# Patient Record
Sex: MALE | Race: WHITE | HISPANIC OR LATINO | Employment: FULL TIME | URBAN - METROPOLITAN AREA
[De-identification: names, ages, dates, MRNs, and addresses within clinical notes are randomized per-mention and may not be internally consistent; named-entity substitution may affect disease eponyms.]

---

## 2024-07-01 ENCOUNTER — OFFICE VISIT (OUTPATIENT)
Dept: ORTHOPEDIC SURGERY | Facility: CLINIC | Age: 34
End: 2024-07-01
Payer: COMMERCIAL

## 2024-07-01 VITALS
RESPIRATION RATE: 20 BRPM | BODY MASS INDEX: 28.14 KG/M2 | WEIGHT: 190 LBS | HEIGHT: 69 IN | OXYGEN SATURATION: 99 % | HEART RATE: 68 BPM

## 2024-07-01 DIAGNOSIS — M25.512 ACUTE PAIN OF LEFT SHOULDER: ICD-10-CM

## 2024-07-01 DIAGNOSIS — S29.011A: Primary | ICD-10-CM

## 2024-07-01 PROCEDURE — 99203 OFFICE O/P NEW LOW 30 MIN: CPT | Performed by: PHYSICIAN ASSISTANT

## 2024-07-01 RX ORDER — IBUPROFEN 200 MG
200 TABLET ORAL EVERY 6 HOURS PRN
COMMUNITY

## 2024-07-01 NOTE — PROGRESS NOTES
"Tyrone is a 34 y.o. year old male presents to [facility]    Chief Complaint   Patient presents with    Left Shoulder - Initial Evaluation, Shoulder Pain     Pain 0/10 injured 05/25/2024 while exercising. Mri done at [facility]- near complete tear noted on report.      History of Present Illness  Tyrone Braga is a right handed 34 y.o. male [employer] presents to clinic for evaluation of left shoulder pain since injury on 5/25/2024 while bench pressing feeling a tear on the descent. Reports \"like Velcro peeling apart\". Subsequent bruising over the proximal shoulder and bicep. Mri was ordered by PCP and referral to Dr Diego. Treatment: advil. Reports improvement in range of motion, but continued weight restriction.     I have reviewed the patient's medical, family, and social history in detail and updated the computerized patient record.    Objective:  Pulse 68   Resp 20   Ht 175.3 cm (69\")   Wt 86.2 kg (190 lb)   SpO2 99%   BMI 28.06 kg/m²      Physical Exam    Vital signs reviewed.   General: No acute distress.  Eyes: conjunctiva clear  ENT: external ears atraumatic  CV: no peripheral edema  Resp: normal respiratory effort  Skin: no rashes or wounds; normal turgor  Psych: mood and affect appropriate; recent and remote memory intact  Neuro: sensation to light touch intact    MSK Exam      Left shoulder:  Intact skin. No ecchymosis. No effusion.   Moderate depression palpable along the left pectoralis    Full passive range of motion of the shoulder  Active IR T11  No weakness nor pain with O'kurtis & supraspinatus/melissa  Negative speed  Belly press 5/5; lift off 5;5     Full ROM of the elbow, wrist and digits  Radial pulse palpable & capillary refill less than 2 seconds to all digits   strength 5/5 and equal bilaterally     Imaging:  IMAGING SCANNED (05/30/2024) [facility]:  Impression:  Near complete tear at the musculotendinous junction of the pectoralis major " with contusion /edema of the muscle & surrounding soft tissues.    MRI outside films (06/12/2024 00:00)     Assessment:  Diagnoses and all orders for this visit:    Traumatic rupture of pectoralis major tendon    Acute pain of left shoulder  -     Cancel: XR Shoulder 2+ View Left    Other orders  -     ibuprofen (ADVIL,MOTRIN) 200 MG tablet; Take 1 tablet by mouth Every 6 (Six) Hours As Needed for Mild Pain. As needed for pain after tear in left shoulder    Plan: Recommend resuming strengthening exercises to the upper extremity, beginning 20-25% of CT with bench pressing and progressively increased to 50% over the next 3-4 weeks. Follow up in 4 weeks for further revaluation of progress to functional lifting and ADLs.     BMI is >= 25 and <30. (Overweight) The following options were offered after discussion;: exercise counseling/recommendations and nutrition counseling/recommendations         Follow Up   Return in about 4 weeks (around 7/29/2024).  Patient was given instructions and counseling regarding his condition or for health maintenance advice. Please see specific information pulled into the AVS if appropriate.     EMR Dragon/Transcription disclaimer:    Much of this encounter note is an electronic transcription/translation of spoken language to printed text.  The electronic translation of spoken language may permit erroneous, or at times, nonsensical words or phrases to be inadvertently transcribed.  Although I have reviewed the note for such errors some may still exist.

## 2024-07-29 ENCOUNTER — OFFICE VISIT (OUTPATIENT)
Dept: ORTHOPEDIC SURGERY | Facility: CLINIC | Age: 34
End: 2024-07-29
Payer: COMMERCIAL

## 2024-07-29 VITALS
HEART RATE: 67 BPM | WEIGHT: 190 LBS | HEIGHT: 69 IN | BODY MASS INDEX: 28.14 KG/M2 | OXYGEN SATURATION: 99 % | RESPIRATION RATE: 20 BRPM

## 2024-07-29 DIAGNOSIS — S29.011A: Primary | ICD-10-CM

## 2024-07-29 DIAGNOSIS — M77.11 LATERAL EPICONDYLITIS OF RIGHT ELBOW: ICD-10-CM

## 2024-07-29 PROCEDURE — 99213 OFFICE O/P EST LOW 20 MIN: CPT | Performed by: PHYSICIAN ASSISTANT

## 2024-07-29 NOTE — PROGRESS NOTES
"   Patient ID: Tyrone Braga is a 34 y.o. male presents for follow up on torn left pectoralis major muscle/tendon.  Patient reports refraining from lifting anything more than 25% of his personal record when it comes to involving his pectoralis muscles and tendons.  States he was lifting dumbbell 15 pound presses the other day and it felt like 60 pounds.  Informed patient to slowly increase his weight over the next 6 to 8 weeks.  Patient voiced understanding    He has a secondary complaint of right elbow pain that has began in the recent weeks.  States it was worst last night when he was vacuuming his couch.  States he has pain with gripping things with the right hand and selecting care with his vehicle gear selector.  Patient denies any treatment as of today.  Qualifies the pain as sharp.  We discussed various treatment options to include topical, versus steroid injection versus PRP.  Patient wishes to proceed with conservative treatments and wait on injection therapy.    Objective:  Pulse 67   Resp 20   Ht 175.3 cm (69\")   Wt 86.2 kg (190 lb)   SpO2 99%   BMI 28.06 kg/m²     Physical Examination:     Left shoulder/pectoral region:  Intact skin.  No ecchymosis.  No tenderness to palpation  Range of motion to the shoulder, elbow and wrist and digits grossly intact  Sensorineural intact in all distributions    Right elbow:  Intact skin.  No effusion.    Acutely tender along the lateral epicondyle  Pain with resisted wrist extension  Pain with resisted third digit extension  Pain with  strength testing;  strength 5/5  Range of motion to the shoulder elbow and wrist grossly intact  Radial pulse palpable and capillary refill less than 2 seconds all digits    Imaging:   No new imaging.     IMAGING SCANNED (05/30/2024)   MRI outside films (06/12/2024 00:00)    [facility]:  Impression:  Near complete tear at the musculotendinous junction of the pectoralis major with contusion /edema of the muscle & " surrounding soft tissues.    Assessment:    Diagnoses and all orders for this visit:    1. Traumatic rupture of pectoralis major tendon (Primary)    2. Lateral epicondylitis of right elbow    Plan: Slow return to full weightlifting involving the pectoralis.  Progressively increase over the next 8 weeks going into 10 to 15% increments of his SD.  With regards to his tennis elbow on the right side, recommend conservative treatment in the form of Voltaren gel applied 5 times daily, ice, HEP provided at checkout with tennis elbow rehab.  If no resolution in the next 8 weeks consider steroid injection versus PRP.  All questions answered.  Follow-up as needed.       Disclaimer: Part of this note may be an electronic transcription/translation of spoken language to printed text using the Dragon Dictation System

## 2024-09-30 ENCOUNTER — OFFICE VISIT (OUTPATIENT)
Dept: ORTHOPEDIC SURGERY | Facility: CLINIC | Age: 34
End: 2024-09-30
Payer: COMMERCIAL

## 2024-09-30 VITALS
WEIGHT: 190 LBS | OXYGEN SATURATION: 98 % | BODY MASS INDEX: 28.14 KG/M2 | HEIGHT: 69 IN | RESPIRATION RATE: 20 BRPM | HEART RATE: 63 BPM

## 2024-09-30 DIAGNOSIS — M77.11 LATERAL EPICONDYLITIS OF RIGHT ELBOW: Primary | ICD-10-CM

## 2024-09-30 RX ADMIN — TRIAMCINOLONE ACETONIDE 40 MG: 40 INJECTION, SUSPENSION INTRA-ARTICULAR; INTRAMUSCULAR at 09:30

## 2024-09-30 NOTE — PROGRESS NOTES
"   Patient ID: Tyrone Braga is a 34 y.o. male presents for further follow up on right elbow pain/tennis elbow. Reports icing daily for > 3 weeks, and performing the Theraband flexbar exercises with no improvement. Lingering pain. Weakness due to pain, notably with . Also, NSAIDs and tennis elbow strap with no improvement. Reports pain with picking up a large drink while driving, i.e. extension at the wrist.     Objective:  Pulse 63   Resp 20   Ht 175.3 cm (69\")   Wt 86.2 kg (190 lb)   SpO2 98%   BMI 28.06 kg/m²     Physical Examination:       Right elbow:  Intact skin.  No effusion.  No apparent atrophy  Tenderness over the distal tendon sheath about the lateral epicondyle  Pain with resisted wrist extension and resisted third digit extension  No weakness, but pain at the lateral epicondyle with resisted supination and pronation of the hand due to   Radial pulse 1+, capillary refill less than 2 seconds all digits  Sensation light touch intact in all distributions    Imaging:   No new imaging    Assessment:    Diagnoses and all orders for this visit:    1. Lateral epicondylitis of right elbow (Primary)    Other orders  -     - Injection Tendon or Ligament    Plan: Recommend tendon sheath injection of triamcinolone 40 mg to the right lateral epicondyle for subjective pain relief and improved function with ADLs.  Recommend avoiding heavy lifting or previously provocative maneuvers for the next 10 to 14 days.  May resume stretching exercises or flex bar in 3 to 4 days.  Gradually begin lifting exercises and 3 to 4 weeks.  Otherwise follow-up as needed.    Right lateral epicondyle- Injection Tendon    Date/Time: 9/30/2024 9:30 AM    Performed by: Oren Coats PA-C  Authorized by: Oren Coats PA-C  Preparation: Patient was prepped and draped in the usual sterile fashion.  Local anesthesia used: yes (Ethyl chloride spray)    Anesthesia:  Local anesthesia used: yes (Ethyl chloride " spray)    Sedation:  Patient sedated: no    Patient tolerance: patient tolerated the procedure well with no immediate complications  Medications administered: 40 mg triamcinolone acetonide 40 MG/ML      Approximately 1 cc of lidocaine PF 1% NDC 79615-339-61 Lot #[id number] expiration date: 4/28/2027    Disclaimer: Part of this note may be an electronic transcription/translation of spoken language to printed text using the Dragon Dictation System

## 2024-10-01 RX ORDER — TRIAMCINOLONE ACETONIDE 40 MG/ML
40 INJECTION, SUSPENSION INTRA-ARTICULAR; INTRAMUSCULAR
Status: COMPLETED | OUTPATIENT
Start: 2024-09-30 | End: 2024-09-30

## 2025-06-03 ENCOUNTER — TELEPHONE (OUTPATIENT)
Dept: ORTHOPEDIC SURGERY | Facility: CLINIC | Age: 35
End: 2025-06-03
Payer: COMMERCIAL

## 2025-06-03 NOTE — TELEPHONE ENCOUNTER
Caller: Tyrone Braga    Relationship to patient: Self    Best call back number: [phone]*    Patient is needing: PT IS CALLING TO INQUIRE ABOUT PRP INJECTIONS FOR THE RIGHT ELBOW.. HE WANTS TO START THE PROCESS AND GET A PRICE FOR THAT.. PLEASE ADVISE..

## 2025-06-04 NOTE — TELEPHONE ENCOUNTER
I spoke to patient and he does want PRP for Right elbow.  He asked the price I advised $400.  I advised him he will receive a call next week.  He expressed an understanding.

## 2025-06-04 NOTE — TELEPHONE ENCOUNTER
Caller: Tyrone Braga    Relationship to patient: Self    Best call back number: [phone]    Patient is needing: PT IS CALLING TO CHECK THE STATUS OF THE MESSAGE SENT YESTERDAY.. PT WANTS TO KNOW WHERE HE STANDS.. PLEASE ADVISE..

## 2025-06-10 NOTE — TELEPHONE ENCOUNTER
What type: ACP right elbow    Pre- procedure instructions:   -Stop all NSAIDs for 14 days  -Hydrate 3 days prior    Cost: $400    ** PRP only scheduled be 8am-10am, 12pm-2pm**    Patient scheduled: Patient given instructions and understood.

## 2025-06-19 ENCOUNTER — OFFICE VISIT (OUTPATIENT)
Dept: ORTHOPEDIC SURGERY | Facility: CLINIC | Age: 35
End: 2025-06-19
Payer: COMMERCIAL

## 2025-06-19 VITALS — WEIGHT: 190 LBS | HEIGHT: 69 IN | BODY MASS INDEX: 28.14 KG/M2 | HEART RATE: 67 BPM | OXYGEN SATURATION: 98 %

## 2025-06-19 DIAGNOSIS — M77.11 LATERAL EPICONDYLITIS OF RIGHT ELBOW: Primary | ICD-10-CM

## 2025-06-19 NOTE — PROGRESS NOTES
"     Patient ID: Tyrone Braga is a 35 y.o. male.  History of Present Illness  The patient presents for a PRP injection.    He has been experiencing pain in his right elbow, which has been severe enough to disrupt his sleep. He has attempted various treatments, including a $ 400 machine, stretching exercises, and the use of a flex bar for a consistent period of 8 weeks, but these interventions have not yielded any significant relief. He has also tried using a compression sleeve with an ice pack. He has previously undergone steroid injections, which unfortunately resulted in the loss of melanin in his skin.    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  Exercise: he may performs light dumbbell side raises and front raises.    Objective:    Pulse 67   Ht 175.3 cm (69\")   Wt 86.2 kg (190 lb)   SpO2 98%   BMI 28.06 kg/m²     Physical Examination:  Physical Exam  Musculoskeletal:  Right upper extremity: Right radial pulse is 1+. Capillary refill is less than 1 second to all digits. Sensation to light touch is intact in all distributions.  strength is 5 out of 5. Range of motion of the digits, wrist, elbow, and shoulder are grossly intact. Visual inspection demonstrates intact skin, mild trace effusion over the lateral epicondyle, and no bruising, no ecchymosis, no signs of infection. Tenderness to palpation exquisitely over the tendon sheath just distal to the lateral epicondyle. No tenderness over the medial epicondyle. No pain or weakness with tricep or bicep strength testing. Pain with resisted third digit extension. Pain with resisted wrist extension at the lateral epicondyle.  Bilateral upper extremities: Sensation to light touch intact.  strength 5 out of 5.     Imaging:  No new imaging    Assessment:    Diagnoses and all orders for this visit:    1. Lateral epicondylitis of right elbow (Primary)    Other orders  -     Cancel: Large " Joint Arthrocentesis  -     - Injection Tendon or Ligament     Plan:  Assessment & Plan  1. Right elbow pain:    A comprehensive discussion was held regarding the potential benefits and risks associated with PRP injections. NSAIDs such as ibuprofen and Aleve should be avoided for 4 to 6 weeks post-injection. Tylenol is acceptable for pain management. Ice can be applied to the affected area, but avoid using Voltaren gel or diclofenac. Continue stretching exercises, but avoid activities that exacerbate the pain. The area may become inflamed for 24 to 36 hours post-injection, but this should subside. The risk of infection is low, estimated at approximately 1 in 10,000. Engage in gentle flexor stretching and resume Thera-Band exercises in 3 to 4 weeks. Avoid activities involving weighted stress to the wrist/hand extension for 6 to 8 weeks to allow for tissue healing.  Patient advised it may take 9 to 10 weeks before full benefit is realized.    Follow-up: Open-ended. Thera-Band exercises every 3 to 4 weeks.    PROCEDURE  PRP injection was performed today in the right elbow tendon sheath.    After discussing risk and benefits, the patient was identified, and the right elbow/lateral epicondyle was prepped and draped in usual fashion.  Betadine and ChloraPrep was used to cleanse the skin.  Ethyl chloride was used for skin anesthesia.  A 22-gauge needle was used to infiltrate the tendon sheath of the right lateral epicondyle.  Nearly 5 cc of PRP was then injected.  IThe needle was then removed.  Blood loss negligible.  A bandage was placed.  The patient tolerated procedure well and had no immediate complications or problems.  RTC in 1 month. Again, discuss no NSAIDs for the next 4-6 weeks.     Oren Coats PA-C  06/19/25  09:57 EDT      Patient or patient representative verbalized consent for the use of Ambient Listening during the visit with  Oren Coats PA-C for chart documentation. 6/19/2025  09:48 EDT  Right  lateral epicondyle- Injection Tendon    Date/Time: 6/19/2025 9:35 AM    Performed by: Oren Coats PA-C  Authorized by: Oren Coats PA-C      Preparation: Patient was prepped and draped in the usual sterile fashion.    Anesthesia:  Local anesthesia used: no    Sedation:  Patient sedated: no    Patient tolerance: patient tolerated the procedure well with no immediate complications  Comments: ACP PRP    Laterality: right  Needle Size: 22  Approach: posterolateral